# Patient Record
Sex: MALE | URBAN - METROPOLITAN AREA
[De-identification: names, ages, dates, MRNs, and addresses within clinical notes are randomized per-mention and may not be internally consistent; named-entity substitution may affect disease eponyms.]

---

## 2022-04-22 ENCOUNTER — APPOINTMENT (RX ONLY)
Dept: URBAN - METROPOLITAN AREA CLINIC 81 | Facility: CLINIC | Age: 62
Setting detail: DERMATOLOGY
End: 2022-04-22

## 2022-04-22 DIAGNOSIS — I78.8 OTHER DISEASES OF CAPILLARIES: ICD-10-CM

## 2022-04-22 PROCEDURE — ? MEDICAL CONSULTATION: PULSED-DYE LASER

## 2022-04-22 PROCEDURE — ? TREATMENT REGIMEN

## 2022-04-22 PROCEDURE — 99202 OFFICE O/P NEW SF 15 MIN: CPT

## 2022-04-22 PROCEDURE — ? COUNSELING

## 2022-04-22 ASSESSMENT — LOCATION SIMPLE DESCRIPTION DERM: LOCATION SIMPLE: RIGHT ANTERIOR NECK

## 2022-04-22 ASSESSMENT — LOCATION ZONE DERM: LOCATION ZONE: NECK

## 2022-04-22 ASSESSMENT — LOCATION DETAILED DESCRIPTION DERM: LOCATION DETAILED: RIGHT CLAVICULAR NECK

## 2022-04-22 NOTE — PROCEDURE: TREATMENT REGIMEN
Plan: Discussed vbeam laser treatment in depth.\\nDiscussed doing test spot.\\n\\nPatient will consider if he wants to treat.
Detail Level: Zone

## 2022-04-22 NOTE — HPI: RASH
What Type Of Note Output Would You Prefer (Optional)?: Bullet Format
Is The Patient Presenting As Previously Scheduled?: No, they are a work-in
How Severe Is Your Rash?: moderate
Is This A New Presentation, Or A Follow-Up?: Rash
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 Now has a red rash. Reports no itch or pain. Had a procedure in the same area 4 years ago.\\n\\nPatient states he uses sunscreen all the time.\\n\\nSCC history: lesion was found on the base of the tongue and was removed surgically. Had 18 lymph nodes removed and then radiation treatment.